# Patient Record
Sex: MALE | Race: OTHER | NOT HISPANIC OR LATINO
[De-identification: names, ages, dates, MRNs, and addresses within clinical notes are randomized per-mention and may not be internally consistent; named-entity substitution may affect disease eponyms.]

---

## 2017-08-07 ENCOUNTER — APPOINTMENT (OUTPATIENT)
Dept: INTERNAL MEDICINE | Facility: CLINIC | Age: 47
End: 2017-08-07

## 2017-09-20 ENCOUNTER — RX RENEWAL (OUTPATIENT)
Age: 47
End: 2017-09-20

## 2017-10-27 ENCOUNTER — APPOINTMENT (OUTPATIENT)
Dept: INTERNAL MEDICINE | Facility: CLINIC | Age: 47
End: 2017-10-27
Payer: COMMERCIAL

## 2017-10-27 ENCOUNTER — MED ADMIN CHARGE (OUTPATIENT)
Age: 47
End: 2017-10-27

## 2017-10-27 VITALS
OXYGEN SATURATION: 98 % | SYSTOLIC BLOOD PRESSURE: 110 MMHG | TEMPERATURE: 98.3 F | HEART RATE: 68 BPM | WEIGHT: 180 LBS | BODY MASS INDEX: 27.28 KG/M2 | HEIGHT: 68 IN | DIASTOLIC BLOOD PRESSURE: 76 MMHG

## 2017-10-27 DIAGNOSIS — L91.8 OTHER HYPERTROPHIC DISORDERS OF THE SKIN: ICD-10-CM

## 2017-10-27 DIAGNOSIS — R30.0 DYSURIA: ICD-10-CM

## 2017-10-27 DIAGNOSIS — R05 COUGH: ICD-10-CM

## 2017-10-27 DIAGNOSIS — M25.561 PAIN IN RIGHT KNEE: ICD-10-CM

## 2017-10-27 DIAGNOSIS — R03.0 ELEVATED BLOOD-PRESSURE READING, W/OUT DIAGNOSIS OF HYPERTENSION: ICD-10-CM

## 2017-10-27 DIAGNOSIS — Z88.9 ALLERGY STATUS TO UNSPECIFIED DRUGS, MEDICAMENTS AND BIOLOGICAL SUBSTANCES: ICD-10-CM

## 2017-10-27 DIAGNOSIS — B00.9 HERPESVIRAL INFECTION, UNSPECIFIED: ICD-10-CM

## 2017-10-27 DIAGNOSIS — Z92.29 PERSONAL HISTORY OF OTHER DRUG THERAPY: ICD-10-CM

## 2017-10-27 DIAGNOSIS — M25.562 PAIN IN RIGHT KNEE: ICD-10-CM

## 2017-10-27 DIAGNOSIS — Z87.09 PERSONAL HISTORY OF OTHER DISEASES OF THE RESPIRATORY SYSTEM: ICD-10-CM

## 2017-10-27 PROCEDURE — 99396 PREV VISIT EST AGE 40-64: CPT

## 2017-10-30 LAB
25(OH)D3 SERPL-MCNC: 25.4 NG/ML
ALBUMIN SERPL ELPH-MCNC: 4.8 G/DL
ALP BLD-CCNC: 75 U/L
ALT SERPL-CCNC: 52 U/L
ANION GAP SERPL CALC-SCNC: 14 MMOL/L
APPEARANCE: CLEAR
AST SERPL-CCNC: 25 U/L
BASOPHILS # BLD AUTO: 0.03 K/UL
BASOPHILS NFR BLD AUTO: 0.4 %
BILIRUB SERPL-MCNC: 0.4 MG/DL
BILIRUBIN URINE: NEGATIVE
BLOOD URINE: NEGATIVE
BUN SERPL-MCNC: 19 MG/DL
C TRACH RRNA SPEC QL NAA+PROBE: NOT DETECTED
CALCIUM SERPL-MCNC: 10 MG/DL
CHLORIDE SERPL-SCNC: 102 MMOL/L
CHOLEST SERPL-MCNC: 185 MG/DL
CHOLEST/HDLC SERPL: 5.1 RATIO
CO2 SERPL-SCNC: 25 MMOL/L
COLOR: YELLOW
CREAT SERPL-MCNC: 1.12 MG/DL
EOSINOPHIL # BLD AUTO: 0.17 K/UL
EOSINOPHIL NFR BLD AUTO: 2.2 %
GLUCOSE QUALITATIVE U: NEGATIVE MG/DL
GLUCOSE SERPL-MCNC: 103 MG/DL
HBA1C MFR BLD HPLC: 6 %
HCT VFR BLD CALC: 46.2 %
HDLC SERPL-MCNC: 36 MG/DL
HGB BLD-MCNC: 15.1 G/DL
HIV1+2 AB SPEC QL IA.RAPID: NONREACTIVE
IMM GRANULOCYTES NFR BLD AUTO: 0.1 %
KETONES URINE: NEGATIVE
LDLC SERPL CALC-MCNC: 110 MG/DL
LEUKOCYTE ESTERASE URINE: NEGATIVE
LYMPHOCYTES # BLD AUTO: 2.41 K/UL
LYMPHOCYTES NFR BLD AUTO: 30.7 %
MAN DIFF?: NORMAL
MCHC RBC-ENTMCNC: 27.1 PG
MCHC RBC-ENTMCNC: 32.7 GM/DL
MCV RBC AUTO: 82.8 FL
MONOCYTES # BLD AUTO: 0.41 K/UL
MONOCYTES NFR BLD AUTO: 5.2 %
N GONORRHOEA RRNA SPEC QL NAA+PROBE: NOT DETECTED
NEUTROPHILS # BLD AUTO: 4.82 K/UL
NEUTROPHILS NFR BLD AUTO: 61.4 %
NITRITE URINE: NEGATIVE
PH URINE: 5.5
PLATELET # BLD AUTO: 232 K/UL
POTASSIUM SERPL-SCNC: 4.9 MMOL/L
PROT SERPL-MCNC: 8.2 G/DL
PROTEIN URINE: NEGATIVE MG/DL
RBC # BLD: 5.58 M/UL
RBC # FLD: 13.5 %
SODIUM SERPL-SCNC: 141 MMOL/L
SOURCE AMPLIFICATION: NORMAL
SPECIFIC GRAVITY URINE: 1.02
TRIGL SERPL-MCNC: 194 MG/DL
TSH SERPL-ACNC: 4.09 UIU/ML
UROBILINOGEN URINE: NEGATIVE MG/DL
WBC # FLD AUTO: 7.85 K/UL

## 2018-04-22 ENCOUNTER — RX RENEWAL (OUTPATIENT)
Age: 48
End: 2018-04-22

## 2019-07-17 ENCOUNTER — TRANSCRIPTION ENCOUNTER (OUTPATIENT)
Age: 49
End: 2019-07-17

## 2020-11-17 ENCOUNTER — APPOINTMENT (OUTPATIENT)
Dept: INTERNAL MEDICINE | Facility: CLINIC | Age: 50
End: 2020-11-17
Payer: COMMERCIAL

## 2020-11-17 ENCOUNTER — LABORATORY RESULT (OUTPATIENT)
Age: 50
End: 2020-11-17

## 2020-11-17 ENCOUNTER — NON-APPOINTMENT (OUTPATIENT)
Age: 50
End: 2020-11-17

## 2020-11-17 VITALS
DIASTOLIC BLOOD PRESSURE: 80 MMHG | HEIGHT: 68 IN | WEIGHT: 190 LBS | HEART RATE: 83 BPM | OXYGEN SATURATION: 99 % | SYSTOLIC BLOOD PRESSURE: 110 MMHG | TEMPERATURE: 98.4 F | BODY MASS INDEX: 28.79 KG/M2

## 2020-11-17 DIAGNOSIS — Z01.84 ENCOUNTER FOR ANTIBODY RESPONSE EXAMINATION: ICD-10-CM

## 2020-11-17 DIAGNOSIS — Z23 ENCOUNTER FOR IMMUNIZATION: ICD-10-CM

## 2020-11-17 DIAGNOSIS — Z12.11 ENCOUNTER FOR SCREENING FOR MALIGNANT NEOPLASM OF COLON: ICD-10-CM

## 2020-11-17 DIAGNOSIS — Z87.891 PERSONAL HISTORY OF NICOTINE DEPENDENCE: ICD-10-CM

## 2020-11-17 PROCEDURE — 36415 COLL VENOUS BLD VENIPUNCTURE: CPT

## 2020-11-17 PROCEDURE — G0008: CPT

## 2020-11-17 PROCEDURE — 90686 IIV4 VACC NO PRSV 0.5 ML IM: CPT

## 2020-11-17 PROCEDURE — 93000 ELECTROCARDIOGRAM COMPLETE: CPT

## 2020-11-17 PROCEDURE — 99386 PREV VISIT NEW AGE 40-64: CPT | Mod: 25

## 2020-11-17 PROCEDURE — 99072 ADDL SUPL MATRL&STAF TM PHE: CPT

## 2020-11-17 RX ORDER — VALACYCLOVIR 500 MG/1
500 TABLET, FILM COATED ORAL
Qty: 30 | Refills: 2 | Status: DISCONTINUED | COMMUNITY
Start: 2017-10-27 | End: 2020-11-17

## 2020-11-17 NOTE — HISTORY OF PRESENT ILLNESS
[FreeTextEntry1] : New patient [de-identified] : 50 year old M comes to establish medical care and for an annual exam. Previously followed by Dr. Bob Ro in [facility]. Last physical was in 2017.\par \par He has history of hyperlipidemia, prior on Pravastatin but stopped in 2017. He reports having ED while on statin.\par He has borderline diabetes, last A1c 6.3. He tries to watch diet. He has not been exercising much due to pandemic but plans to restart again.\par He would like flu shot.\par He is due for initial colonoscopy screening. \par He has chronic shoulder pain L>R. No prior trauma.

## 2020-11-17 NOTE — PHYSICAL EXAM
[No Acute Distress] : no acute distress [Well Nourished] : well nourished [Well Developed] : well developed [Well-Appearing] : well-appearing [Normal Sclera/Conjunctiva] : normal sclera/conjunctiva [PERRL] : pupils equal round and reactive to light [EOMI] : extraocular movements intact [Normal Outer Ear/Nose] : the outer ears and nose were normal in appearance [Normal Oropharynx] : the oropharynx was normal [No JVD] : no jugular venous distention [No Lymphadenopathy] : no lymphadenopathy [Supple] : supple [Thyroid Normal, No Nodules] : the thyroid was normal and there were no nodules present [No Respiratory Distress] : no respiratory distress  [No Accessory Muscle Use] : no accessory muscle use [Clear to Auscultation] : lungs were clear to auscultation bilaterally [Normal Rate] : normal rate  [Regular Rhythm] : with a regular rhythm [Normal S1, S2] : normal S1 and S2 [No Murmur] : no murmur heard [No Carotid Bruits] : no carotid bruits [Pedal Pulses Present] : the pedal pulses are present [No Edema] : there was no peripheral edema [Soft] : abdomen soft [Non Tender] : non-tender [Non-distended] : non-distended [No Masses] : no abdominal mass palpated [No HSM] : no HSM [Normal Bowel Sounds] : normal bowel sounds [Normal Posterior Cervical Nodes] : no posterior cervical lymphadenopathy [Normal Anterior Cervical Nodes] : no anterior cervical lymphadenopathy [No CVA Tenderness] : no CVA  tenderness [No Spinal Tenderness] : no spinal tenderness [No Joint Swelling] : no joint swelling [Grossly Normal Strength/Tone] : grossly normal strength/tone [No Rash] : no rash [Coordination Grossly Intact] : coordination grossly intact [No Focal Deficits] : no focal deficits [Normal Gait] : normal gait [Normal Affect] : the affect was normal [Normal Mood] : the mood was normal [Normal Insight/Judgement] : insight and judgment were intact [de-identified] : friendly [FreeTextEntry1] : defer to GI  [de-identified] : shoulders FROM, non-tender  [de-identified] : hypopigmented macules on back

## 2020-11-17 NOTE — PLAN
[FreeTextEntry1] : Check routine fasting labs.\par Discussed diet, exercise, and weight maintenance. \par Flu shot given today.\par Due for colonoscopy screening. GI referral placed for Dr. Hall.\par Prostate cancer screening with PSA. \par Recommend dermatology exam. \par Check fasting lipids and A1c. Prior on Pravastatin. Watch diet and start exercising. Lose weight.\par Previously elevated ALT likely fatty liver. Check LFT's.\par Check vitamin D level, sometimes takes supplement.\par \par RTO 6 months for fasting labs.\par \par

## 2020-11-17 NOTE — HEALTH RISK ASSESSMENT
[Excellent] : ~his/her~  mood as  excellent [No] : In the past 12 months have you used drugs other than those required for medical reasons? No [No falls in past year] : Patient reported no falls in the past year [0] : 2) Feeling down, depressed, or hopeless: Not at all (0) [With Family] : lives with family [Employed] : employed [] :  [Sexually Active] : sexually active [Reports changes in vision] : Reports changes in vision [Patient declined colonoscopy] : Patient declined colonoscopy [None] : None [# Of Children ___] : has [unfilled] children [Smoke Detector] : smoke detector [Carbon Monoxide Detector] : carbon monoxide detector [Seat Belt] :  uses seat belt [Sunscreen] : uses sunscreen [] : No [Reports changes in hearing] : Reports no changes in hearing [Reports changes in dental health] : Reports no changes in dental health [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

## 2020-11-22 ENCOUNTER — TRANSCRIPTION ENCOUNTER (OUTPATIENT)
Age: 50
End: 2020-11-22

## 2020-11-22 LAB
25(OH)D3 SERPL-MCNC: 17.6 NG/ML
ALBUMIN SERPL ELPH-MCNC: 4.7 G/DL
ALP BLD-CCNC: 92 U/L
ALT SERPL-CCNC: 40 U/L
ANION GAP SERPL CALC-SCNC: 11 MMOL/L
APPEARANCE: CLEAR
AST SERPL-CCNC: 20 U/L
BACTERIA: NEGATIVE
BASOPHILS # BLD AUTO: 0.04 K/UL
BASOPHILS NFR BLD AUTO: 0.5 %
BILIRUB SERPL-MCNC: 0.4 MG/DL
BILIRUBIN URINE: NEGATIVE
BLOOD URINE: NEGATIVE
BUN SERPL-MCNC: 15 MG/DL
CALCIUM SERPL-MCNC: 9.6 MG/DL
CHLORIDE SERPL-SCNC: 100 MMOL/L
CHOLEST SERPL-MCNC: 218 MG/DL
CO2 SERPL-SCNC: 26 MMOL/L
COLOR: NORMAL
CREAT SERPL-MCNC: 0.94 MG/DL
EOSINOPHIL # BLD AUTO: 0.17 K/UL
EOSINOPHIL NFR BLD AUTO: 2 %
ESTIMATED AVERAGE GLUCOSE: 131 MG/DL
GLUCOSE QUALITATIVE U: NEGATIVE
GLUCOSE SERPL-MCNC: 101 MG/DL
HBA1C MFR BLD HPLC: 6.2 %
HCT VFR BLD CALC: 45.2 %
HDLC SERPL-MCNC: 29 MG/DL
HGB BLD-MCNC: 14 G/DL
HYALINE CASTS: 0 /LPF
IMM GRANULOCYTES NFR BLD AUTO: 0.2 %
KETONES URINE: NEGATIVE
LDLC SERPL CALC-MCNC: 153 MG/DL
LEUKOCYTE ESTERASE URINE: NEGATIVE
LYMPHOCYTES # BLD AUTO: 2.8 K/UL
LYMPHOCYTES NFR BLD AUTO: 32.6 %
MAN DIFF?: NORMAL
MCHC RBC-ENTMCNC: 25.9 PG
MCHC RBC-ENTMCNC: 31 GM/DL
MCV RBC AUTO: 83.7 FL
MICROSCOPIC-UA: NORMAL
MONOCYTES # BLD AUTO: 0.51 K/UL
MONOCYTES NFR BLD AUTO: 5.9 %
NEUTROPHILS # BLD AUTO: 5.04 K/UL
NEUTROPHILS NFR BLD AUTO: 58.8 %
NITRITE URINE: NEGATIVE
NONHDLC SERPL-MCNC: 189 MG/DL
PH URINE: 5.5
PLATELET # BLD AUTO: 246 K/UL
POTASSIUM SERPL-SCNC: 4.5 MMOL/L
PROT SERPL-MCNC: 7.7 G/DL
PROTEIN URINE: NEGATIVE
PSA SERPL-MCNC: 0.68 NG/ML
RBC # BLD: 5.4 M/UL
RBC # FLD: 13.2 %
RED BLOOD CELLS URINE: 1 /HPF
SARS-COV-2 IGG SERPL IA-ACNC: 0.1 INDEX
SARS-COV-2 IGG SERPL QL IA: NEGATIVE
SODIUM SERPL-SCNC: 138 MMOL/L
SPECIFIC GRAVITY URINE: 1.02
SQUAMOUS EPITHELIAL CELLS: 0 /HPF
TRIGL SERPL-MCNC: 181 MG/DL
TSH SERPL-ACNC: 4.56 UIU/ML
UROBILINOGEN URINE: NORMAL
VIT B12 SERPL-MCNC: 380 PG/ML
WBC # FLD AUTO: 8.58 K/UL
WHITE BLOOD CELLS URINE: 0 /HPF

## 2021-05-04 PROBLEM — Z00.00 ENCOUNTER FOR PREVENTIVE HEALTH EXAMINATION: Noted: 2021-05-04

## 2021-05-07 ENCOUNTER — APPOINTMENT (OUTPATIENT)
Dept: GASTROENTEROLOGY | Facility: CLINIC | Age: 51
End: 2021-05-07
Payer: COMMERCIAL

## 2021-05-07 VITALS
HEIGHT: 68 IN | OXYGEN SATURATION: 98 % | WEIGHT: 192 LBS | BODY MASS INDEX: 29.1 KG/M2 | DIASTOLIC BLOOD PRESSURE: 91 MMHG | HEART RATE: 84 BPM | SYSTOLIC BLOOD PRESSURE: 135 MMHG

## 2021-05-07 DIAGNOSIS — Z78.9 OTHER SPECIFIED HEALTH STATUS: ICD-10-CM

## 2021-05-07 DIAGNOSIS — Z83.71 FAMILY HISTORY OF COLONIC POLYPS: ICD-10-CM

## 2021-05-07 PROCEDURE — 99204 OFFICE O/P NEW MOD 45 MIN: CPT

## 2021-05-07 PROCEDURE — 99072 ADDL SUPL MATRL&STAF TM PHE: CPT

## 2021-05-07 NOTE — PHYSICAL EXAM
[General Appearance - Alert] : alert [General Appearance - In No Acute Distress] : in no acute distress [Sclera] : the sclera and conjunctiva were normal [Extraocular Movements] : extraocular movements were intact [Hearing Threshold Finger Rub Not [place]] : hearing was normal [Outer Ear] : the ears and nose were normal in appearance [Neck Appearance] : the appearance of the neck was normal [Neck Cervical Mass (___cm)] : no neck mass was observed [Auscultation Breath Sounds / Voice Sounds] : lungs were clear to auscultation bilaterally [Heart Rate And Rhythm] : heart rate was normal and rhythm regular [Heart Sounds] : normal S1 and S2 [Heart Sounds Gallop] : no gallops [Murmurs] : no murmurs [Heart Sounds Pericardial Friction Rub] : no pericardial rub [Bowel Sounds] : normal bowel sounds [Abdomen Soft] : soft [Abdomen Tenderness] : non-tender [Abdomen Mass (___ Cm)] : no abdominal mass palpated [Cervical Lymph Nodes Enlarged Posterior Bilaterally] : posterior cervical [Cervical Lymph Nodes Enlarged Anterior Bilaterally] : anterior cervical [Supraclavicular Lymph Nodes Enlarged Bilaterally] : supraclavicular [Abnormal Walk] : normal gait [Nail Clubbing] : no clubbing  or cyanosis of the fingernails [Skin Color & Pigmentation] : normal skin color and pigmentation [] : no rash [Oriented To Time, Place, And Person] : oriented to person, place, and time [Impaired Insight] : insight and judgment were intact [Affect] : the affect was normal

## 2021-05-08 NOTE — ASSESSMENT
Problem: Patient Care Overview  Goal: Plan of Care Review  Outcome: Ongoing (interventions implemented as appropriate)  Pt tolerated INV [id number] & velcade without adverse effects. Followed study protocol. VSS. Provided AVS & verbalized understanding of RTC date. DC after 90 min obs period ambulating independently.          [FreeTextEntry1] : IMPRESSION: \par #Due for a screening test for colon cancer \par -  Never had a colonoscopy\par #Family history of colon polyps \par -  Mom with colon polyps \par \par \par \par \par PLAN: \par I had a long discussion with the patient regarding colon cancer in the [place].    \par \par We reviewed risk factors for colon cancer which include age, having a family hx of colon cancer, cancer syndromes, personal hx of inflammatory disease, having comorbidities such as obesity, DM, cardiac disease, having nicotine addiction, alcohol addiction,etc.  \par \par Patient is average risk for colon cancer.  \par \par We reviewed the screening tests for colon cancer prevention.  We discussed screening tests such as stool test, CT scans such as CT colonography, and a colonoscopy.    Patient was made aware that despite these screening test, a cancerous lesion can still be missed.  The gold standard test is a screening colonoscopy.\par \par I have advised the patient to arrange for a colonoscopy to rule out colon polyps, colorectal cancer etc. under monitored anesthesia care.  Risks such as perforation  (4 in 10,000) requiring surgery, bleeding (8 in 10,000), infection, diverticulitis, colitis, missed colon cancer (2% to 6%), internal organ injury, etc, risks of bowel prep including colitis, syncope, adverse reaction to medication etc. and risks of anesthesia including cardiopulmonary compromise were discussed with patient.  Patient verbalized understanding and agrees to proceed with the planned procedure.\par \par

## 2021-05-08 NOTE — HISTORY OF PRESENT ILLNESS
[de-identified] : 50 y/o father of four, who presents for a colonoscopy. \par \par Has never had a colonoscopy before. \par \par No constipation\par No diarrhea. \par No melena and no hematochezia.\par No abdominal pain, and no rectal pain.\par \par No loss of appetite, no weight loss. \par No heartburn. \par No difficulty swallowing, no painful swallowing. \par \par No nausea and no vomiting\par \par Patient denies family history of GI cancers.\par \par Joint pains.  All other review of systems are negative.  Denies cardiac symptoms.

## 2021-05-20 ENCOUNTER — APPOINTMENT (OUTPATIENT)
Dept: INTERNAL MEDICINE | Facility: CLINIC | Age: 51
End: 2021-05-20

## 2021-07-29 ENCOUNTER — TRANSCRIPTION ENCOUNTER (OUTPATIENT)
Age: 51
End: 2021-07-29

## 2022-01-24 ENCOUNTER — NON-APPOINTMENT (OUTPATIENT)
Age: 52
End: 2022-01-24

## 2022-01-24 ENCOUNTER — APPOINTMENT (OUTPATIENT)
Dept: INTERNAL MEDICINE | Facility: CLINIC | Age: 52
End: 2022-01-24
Payer: COMMERCIAL

## 2022-01-24 VITALS
HEIGHT: 68 IN | OXYGEN SATURATION: 98 % | DIASTOLIC BLOOD PRESSURE: 80 MMHG | TEMPERATURE: 98.6 F | BODY MASS INDEX: 28.95 KG/M2 | HEART RATE: 78 BPM | WEIGHT: 191 LBS | SYSTOLIC BLOOD PRESSURE: 100 MMHG

## 2022-01-24 DIAGNOSIS — Z20.822 CONTACT WITH AND (SUSPECTED) EXPOSURE TO COVID-19: ICD-10-CM

## 2022-01-24 PROCEDURE — 36415 COLL VENOUS BLD VENIPUNCTURE: CPT

## 2022-01-24 PROCEDURE — 93000 ELECTROCARDIOGRAM COMPLETE: CPT

## 2022-01-24 PROCEDURE — 99396 PREV VISIT EST AGE 40-64: CPT | Mod: 25

## 2022-01-24 PROCEDURE — G0008: CPT

## 2022-01-24 PROCEDURE — 90686 IIV4 VACC NO PRSV 0.5 ML IM: CPT

## 2022-01-24 NOTE — REVIEW OF SYSTEMS
[Negative] : Heme/Lymph [Recent Change In Weight] : ~T recent weight change [FreeTextEntry2] : gained few lbs

## 2022-01-24 NOTE — PHYSICAL EXAM
[Well Nourished] : well nourished [Well Developed] : well developed [Well-Appearing] : well-appearing [Normal Sclera/Conjunctiva] : normal sclera/conjunctiva [PERRL] : pupils equal round and reactive to light [EOMI] : extraocular movements intact [Normal Outer Ear/Nose] : the outer ears and nose were normal in appearance [Normal Oropharynx] : the oropharynx was normal [No JVD] : no jugular venous distention [No Lymphadenopathy] : no lymphadenopathy [Supple] : supple [Thyroid Normal, No Nodules] : the thyroid was normal and there were no nodules present [No Respiratory Distress] : no respiratory distress  [No Accessory Muscle Use] : no accessory muscle use [Clear to Auscultation] : lungs were clear to auscultation bilaterally [Normal Rate] : normal rate  [Regular Rhythm] : with a regular rhythm [Normal S1, S2] : normal S1 and S2 [No Murmur] : no murmur heard [No Carotid Bruits] : no carotid bruits [Pedal Pulses Present] : the pedal pulses are present [No Edema] : there was no peripheral edema [Soft] : abdomen soft [Non Tender] : non-tender [Non-distended] : non-distended [No Masses] : no abdominal mass palpated [No HSM] : no HSM [Normal Bowel Sounds] : normal bowel sounds [Normal Posterior Cervical Nodes] : no posterior cervical lymphadenopathy [Normal Anterior Cervical Nodes] : no anterior cervical lymphadenopathy [No CVA Tenderness] : no CVA  tenderness [No Spinal Tenderness] : no spinal tenderness [No Joint Swelling] : no joint swelling [Grossly Normal Strength/Tone] : grossly normal strength/tone [No Rash] : no rash [Coordination Grossly Intact] : coordination grossly intact [No Focal Deficits] : no focal deficits [Normal Gait] : normal gait [Normal Affect] : the affect was normal [Normal Mood] : the mood was normal [Normal Insight/Judgement] : insight and judgment were intact [de-identified] : friendly [FreeTextEntry1] : defer to GI  [de-identified] : hypopigmented macules on back

## 2022-01-24 NOTE — HEALTH RISK ASSESSMENT
[Excellent] : ~his/her~  mood as  excellent [No] : In the past 12 months have you used drugs other than those required for medical reasons? No [No falls in past year] : Patient reported no falls in the past year [0] : 2) Feeling down, depressed, or hopeless: Not at all (0) [Patient declined colonoscopy] : Patient declined colonoscopy [None] : None [With Family] : lives with family [Employed] : employed [] :  [# Of Children ___] : has [unfilled] children [Sexually Active] : sexually active [Reports changes in vision] : Reports changes in vision [Smoke Detector] : smoke detector [Carbon Monoxide Detector] : carbon monoxide detector [Seat Belt] :  uses seat belt [Sunscreen] : uses sunscreen [Never] : Never [Reports changes in hearing] : Reports no changes in hearing [Reports changes in dental health] : Reports no changes in dental health [ColonoscopyComments] : never  [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

## 2022-01-24 NOTE — PLAN
[FreeTextEntry1] : Check routine fasting labs.\par Discussed diet, exercise, and weight maintenance. \par Flu shot given today. Received COVID vaccine x 3.\par Advised due for colonoscopy screening. Recommend seeing Dr. Hall. \par Prostate cancer screening with PSA. \par Recommend dermatology exam. \par Check fasting lipids and A1c. Watch diet and start exercising. Lose weight.\par Check vitamin D level, not on supplement.\par Check COVID PCR prior to travel [place].\par \par RTO in 1 yr for annual exam or earlier PRN for acute care.\par \par

## 2022-01-24 NOTE — HISTORY OF PRESENT ILLNESS
[FreeTextEntry1] : physical [de-identified] : Patient comes for an annual exam.\par \par He gained a few pounds. Not watching diet well. Not exercising much. He plans to improve his lifestyle. \par He is travelling to [place] on 1/27, needs COVID PCR.\par

## 2022-01-25 ENCOUNTER — TRANSCRIPTION ENCOUNTER (OUTPATIENT)
Age: 52
End: 2022-01-25

## 2022-01-25 LAB
25(OH)D3 SERPL-MCNC: 20.7 NG/ML
ALBUMIN SERPL ELPH-MCNC: 5 G/DL
ALP BLD-CCNC: 87 U/L
ALT SERPL-CCNC: 45 U/L
ANION GAP SERPL CALC-SCNC: 18 MMOL/L
APPEARANCE: CLEAR
AST SERPL-CCNC: 29 U/L
BACTERIA: NEGATIVE
BASOPHILS # BLD AUTO: 0.04 K/UL
BASOPHILS NFR BLD AUTO: 0.4 %
BILIRUB SERPL-MCNC: 0.6 MG/DL
BILIRUBIN URINE: NEGATIVE
BLOOD URINE: NEGATIVE
BUN SERPL-MCNC: 16 MG/DL
CALCIUM SERPL-MCNC: 10.2 MG/DL
CHLORIDE SERPL-SCNC: 100 MMOL/L
CHOLEST SERPL-MCNC: 276 MG/DL
CO2 SERPL-SCNC: 22 MMOL/L
COLOR: YELLOW
CREAT SERPL-MCNC: 0.94 MG/DL
EOSINOPHIL # BLD AUTO: 0.16 K/UL
EOSINOPHIL NFR BLD AUTO: 1.8 %
ESTIMATED AVERAGE GLUCOSE: 131 MG/DL
GLUCOSE QUALITATIVE U: NEGATIVE
GLUCOSE SERPL-MCNC: 94 MG/DL
HBA1C MFR BLD HPLC: 6.2 %
HCT VFR BLD CALC: 45.8 %
HDLC SERPL-MCNC: 37 MG/DL
HGB BLD-MCNC: 14.3 G/DL
HYALINE CASTS: 0 /LPF
IMM GRANULOCYTES NFR BLD AUTO: 0.2 %
KETONES URINE: NEGATIVE
LDLC SERPL CALC-MCNC: 205 MG/DL
LEUKOCYTE ESTERASE URINE: NEGATIVE
LYMPHOCYTES # BLD AUTO: 3.06 K/UL
LYMPHOCYTES NFR BLD AUTO: 34 %
MAN DIFF?: NORMAL
MCHC RBC-ENTMCNC: 26 PG
MCHC RBC-ENTMCNC: 31.2 GM/DL
MCV RBC AUTO: 83.1 FL
MICROSCOPIC-UA: NORMAL
MONOCYTES # BLD AUTO: 0.53 K/UL
MONOCYTES NFR BLD AUTO: 5.9 %
NEUTROPHILS # BLD AUTO: 5.18 K/UL
NEUTROPHILS NFR BLD AUTO: 57.7 %
NITRITE URINE: NEGATIVE
NONHDLC SERPL-MCNC: 239 MG/DL
PH URINE: 6
PLATELET # BLD AUTO: 264 K/UL
POTASSIUM SERPL-SCNC: 4.5 MMOL/L
PROT SERPL-MCNC: 8 G/DL
PROTEIN URINE: NEGATIVE
PSA SERPL-MCNC: 0.81 NG/ML
RBC # BLD: 5.51 M/UL
RBC # FLD: 13.7 %
RED BLOOD CELLS URINE: 1 /HPF
SARS-COV-2 N GENE NPH QL NAA+PROBE: NOT DETECTED
SODIUM SERPL-SCNC: 141 MMOL/L
SPECIFIC GRAVITY URINE: 1.02
SQUAMOUS EPITHELIAL CELLS: 0 /HPF
TRIGL SERPL-MCNC: 171 MG/DL
TSH SERPL-ACNC: 3.59 UIU/ML
UROBILINOGEN URINE: NORMAL
VIT B12 SERPL-MCNC: 891 PG/ML
WBC # FLD AUTO: 8.99 K/UL
WHITE BLOOD CELLS URINE: 0 /HPF

## 2022-01-26 ENCOUNTER — TRANSCRIPTION ENCOUNTER (OUTPATIENT)
Age: 52
End: 2022-01-26

## 2022-05-16 ENCOUNTER — NON-APPOINTMENT (OUTPATIENT)
Age: 52
End: 2022-05-16

## 2022-11-16 ENCOUNTER — TRANSCRIPTION ENCOUNTER (OUTPATIENT)
Age: 52
End: 2022-11-16

## 2022-12-01 ENCOUNTER — APPOINTMENT (OUTPATIENT)
Dept: INTERNAL MEDICINE | Facility: CLINIC | Age: 52
End: 2022-12-01

## 2022-12-01 VITALS
TEMPERATURE: 98.5 F | BODY MASS INDEX: 28.79 KG/M2 | SYSTOLIC BLOOD PRESSURE: 116 MMHG | OXYGEN SATURATION: 98 % | HEART RATE: 90 BPM | WEIGHT: 190 LBS | DIASTOLIC BLOOD PRESSURE: 82 MMHG | HEIGHT: 68 IN

## 2022-12-01 DIAGNOSIS — R68.82 DECREASED LIBIDO: ICD-10-CM

## 2022-12-01 PROCEDURE — 99214 OFFICE O/P EST MOD 30 MIN: CPT | Mod: 25

## 2022-12-01 PROCEDURE — G0008: CPT

## 2022-12-01 PROCEDURE — 90686 IIV4 VACC NO PRSV 0.5 ML IM: CPT

## 2022-12-01 PROCEDURE — 36415 COLL VENOUS BLD VENIPUNCTURE: CPT

## 2022-12-01 NOTE — HISTORY OF PRESENT ILLNESS
[FreeTextEntry1] : HLD, borderline DM [de-identified] : Pt comes for follow-up visit and fasting labs. Last seen in 1/22. \par \par Labs in 1/22 revealed worsened LDL to 205, A1c 6.2. \par He is now on an intermittent fast diet. Previously on Pravastatin few yrs ago.\par He joined [employer], does weights and cardio.

## 2022-12-01 NOTE — PLAN
[FreeTextEntry1] : Check fasting lipids. May need to restart statin pending LDL result. \par Borderline DM - check A1c. Limit sweets.\par Continue diet / exercise. Lose weight.\par Flu shot given today. \par \par RTO in 3 months for physical.\par \par

## 2022-12-01 NOTE — PHYSICAL EXAM
[Well Nourished] : well nourished [Well Developed] : well developed [Well-Appearing] : well-appearing [No Lymphadenopathy] : no lymphadenopathy [Supple] : supple [Thyroid Normal, No Nodules] : the thyroid was normal and there were no nodules present [No Respiratory Distress] : no respiratory distress  [No Accessory Muscle Use] : no accessory muscle use [Clear to Auscultation] : lungs were clear to auscultation bilaterally [Normal Rate] : normal rate  [Regular Rhythm] : with a regular rhythm [Normal S1, S2] : normal S1 and S2 [No Murmur] : no murmur heard [No Edema] : there was no peripheral edema [Soft] : abdomen soft [Non Tender] : non-tender [Non-distended] : non-distended [No HSM] : no HSM [Normal Bowel Sounds] : normal bowel sounds [Normal Posterior Cervical Nodes] : no posterior cervical lymphadenopathy [Normal Anterior Cervical Nodes] : no anterior cervical lymphadenopathy [No Rash] : no rash [Normal Gait] : normal gait [Normal Affect] : the affect was normal [Normal Mood] : the mood was normal [de-identified] : friendly [de-identified] : hypopigmented macules on back

## 2022-12-02 ENCOUNTER — TRANSCRIPTION ENCOUNTER (OUTPATIENT)
Age: 52
End: 2022-12-02

## 2022-12-02 LAB
ALBUMIN SERPL ELPH-MCNC: 4.7 G/DL
ALP BLD-CCNC: 77 U/L
ALT SERPL-CCNC: 35 U/L
ANION GAP SERPL CALC-SCNC: 13 MMOL/L
AST SERPL-CCNC: 23 U/L
BILIRUB SERPL-MCNC: 0.4 MG/DL
BUN SERPL-MCNC: 21 MG/DL
CALCIUM SERPL-MCNC: 9.4 MG/DL
CHLORIDE SERPL-SCNC: 104 MMOL/L
CHOLEST SERPL-MCNC: 263 MG/DL
CO2 SERPL-SCNC: 25 MMOL/L
CREAT SERPL-MCNC: 1 MG/DL
EGFR: 89 ML/MIN/1.73M2
ESTIMATED AVERAGE GLUCOSE: 134 MG/DL
GLUCOSE SERPL-MCNC: 101 MG/DL
HBA1C MFR BLD HPLC: 6.3 %
HDLC SERPL-MCNC: 35 MG/DL
LDLC SERPL CALC-MCNC: 201 MG/DL
NONHDLC SERPL-MCNC: 227 MG/DL
POTASSIUM SERPL-SCNC: 4.3 MMOL/L
PROT SERPL-MCNC: 7.8 G/DL
SODIUM SERPL-SCNC: 142 MMOL/L
TRIGL SERPL-MCNC: 134 MG/DL

## 2022-12-06 LAB
TESTOST FREE SERPL-MCNC: 7.3 PG/ML
TESTOST SERPL-MCNC: 297 NG/DL

## 2023-06-30 ENCOUNTER — RX RENEWAL (OUTPATIENT)
Age: 53
End: 2023-06-30

## 2023-08-22 ENCOUNTER — NON-APPOINTMENT (OUTPATIENT)
Age: 53
End: 2023-08-22

## 2023-08-22 ENCOUNTER — LABORATORY RESULT (OUTPATIENT)
Age: 53
End: 2023-08-22

## 2023-08-22 ENCOUNTER — APPOINTMENT (OUTPATIENT)
Dept: INTERNAL MEDICINE | Facility: CLINIC | Age: 53
End: 2023-08-22
Payer: COMMERCIAL

## 2023-08-22 VITALS
SYSTOLIC BLOOD PRESSURE: 121 MMHG | WEIGHT: 189 LBS | OXYGEN SATURATION: 97 % | TEMPERATURE: 98 F | HEART RATE: 74 BPM | BODY MASS INDEX: 28.64 KG/M2 | HEIGHT: 68 IN | DIASTOLIC BLOOD PRESSURE: 82 MMHG

## 2023-08-22 DIAGNOSIS — E55.9 VITAMIN D DEFICIENCY, UNSPECIFIED: ICD-10-CM

## 2023-08-22 DIAGNOSIS — Z00.00 ENCOUNTER FOR GENERAL ADULT MEDICAL EXAMINATION W/OUT ABNORMAL FINDINGS: ICD-10-CM

## 2023-08-22 DIAGNOSIS — Z12.11 ENCOUNTER FOR SCREENING FOR MALIGNANT NEOPLASM OF COLON: ICD-10-CM

## 2023-08-22 PROCEDURE — 93000 ELECTROCARDIOGRAM COMPLETE: CPT

## 2023-08-22 PROCEDURE — 99396 PREV VISIT EST AGE 40-64: CPT | Mod: 25

## 2023-08-22 PROCEDURE — 36415 COLL VENOUS BLD VENIPUNCTURE: CPT

## 2023-08-22 NOTE — HEALTH RISK ASSESSMENT
[Excellent] : ~his/her~  mood as  excellent [No] : In the past 12 months have you used drugs other than those required for medical reasons? No [No falls in past year] : Patient reported no falls in the past year [0] : 2) Feeling down, depressed, or hopeless: Not at all (0) [Patient declined colonoscopy] : Patient declined colonoscopy [None] : None [With Family] : lives with family [Employed] : employed [] :  [# Of Children ___] : has [unfilled] children [Sexually Active] : sexually active [Reports changes in vision] : Reports changes in vision [Smoke Detector] : smoke detector [Carbon Monoxide Detector] : carbon monoxide detector [Seat Belt] :  uses seat belt [Sunscreen] : uses sunscreen [Former] : Former [10-14] : 10-14 [< 15 Years] : < 15 Years [de-identified] : gym - weights / cardio 3x a week  [Reports changes in hearing] : Reports no changes in hearing [Reports changes in dental health] : Reports no changes in dental health [ColonoscopyComments] : never  [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

## 2023-08-22 NOTE — PLAN
[FreeTextEntry1] : Check routine fasting labs. Discussed diet, exercise, and weight maintenance.  Received COVID vaccine x 3. Flu shot in Fall.  Advised again to see GI for screening colonoscopy.  Prostate cancer screening with PSA.  HLD - check fasting lipid panel / chemistries, continue Pravastatin.  Scheduled to see ortho next month for right shoulder pain. Check vitamin D level, not on supplement.  RTO 4 months fasting.

## 2023-08-22 NOTE — PHYSICAL EXAM
[Well Nourished] : well nourished [Well Developed] : well developed [Well-Appearing] : well-appearing [Normal Sclera/Conjunctiva] : normal sclera/conjunctiva [PERRL] : pupils equal round and reactive to light [EOMI] : extraocular movements intact [Normal Outer Ear/Nose] : the outer ears and nose were normal in appearance [Normal Oropharynx] : the oropharynx was normal [No JVD] : no jugular venous distention [No Lymphadenopathy] : no lymphadenopathy [Supple] : supple [Thyroid Normal, No Nodules] : the thyroid was normal and there were no nodules present [No Respiratory Distress] : no respiratory distress  [No Accessory Muscle Use] : no accessory muscle use [Clear to Auscultation] : lungs were clear to auscultation bilaterally [Normal Rate] : normal rate  [Regular Rhythm] : with a regular rhythm [Normal S1, S2] : normal S1 and S2 [No Murmur] : no murmur heard [No Carotid Bruits] : no carotid bruits [Pedal Pulses Present] : the pedal pulses are present [No Edema] : there was no peripheral edema [Soft] : abdomen soft [Non Tender] : non-tender [Non-distended] : non-distended [No Masses] : no abdominal mass palpated [No HSM] : no HSM [Normal Bowel Sounds] : normal bowel sounds [No CVA Tenderness] : no CVA  tenderness [No Spinal Tenderness] : no spinal tenderness [No Joint Swelling] : no joint swelling [Grossly Normal Strength/Tone] : grossly normal strength/tone [No Rash] : no rash [Coordination Grossly Intact] : coordination grossly intact [No Focal Deficits] : no focal deficits [Normal Gait] : normal gait [Normal Affect] : the affect was normal [Normal Mood] : the mood was normal [Normal Insight/Judgement] : insight and judgment were intact [de-identified] : friendly [FreeTextEntry1] : defer to GI  [de-identified] : hypopigmented macules on back

## 2023-08-22 NOTE — HISTORY OF PRESENT ILLNESS
[FreeTextEntry1] : physical [de-identified] : Patient comes for an annual exam.  He reports feeling well. He is mostly compliant with Pravastatin, usually takes 5x a week. He complains of right posterior shoulder pain x few months. Pain not improving. Sleeps on right side. Seeing ortho Dr. Santamaria for consult on 9/5.

## 2023-08-22 NOTE — REVIEW OF SYSTEMS
[Recent Change In Weight] : ~T recent weight change [Negative] : Heme/Lymph [Joint Pain] : joint pain [FreeTextEntry2] : 1 lb loss  [FreeTextEntry9] : right shoulder pain x few months

## 2023-08-23 ENCOUNTER — TRANSCRIPTION ENCOUNTER (OUTPATIENT)
Age: 53
End: 2023-08-23

## 2023-08-23 LAB
25(OH)D3 SERPL-MCNC: 21 NG/ML
ALBUMIN SERPL ELPH-MCNC: 4.7 G/DL
ALP BLD-CCNC: 80 U/L
ALT SERPL-CCNC: 27 U/L
ANION GAP SERPL CALC-SCNC: 13 MMOL/L
APPEARANCE: CLEAR
AST SERPL-CCNC: 19 U/L
BACTERIA: NEGATIVE /HPF
BASOPHILS # BLD AUTO: 0.04 K/UL
BASOPHILS NFR BLD AUTO: 0.5 %
BILIRUB SERPL-MCNC: 0.5 MG/DL
BILIRUBIN URINE: NEGATIVE
BLOOD URINE: NEGATIVE
BUN SERPL-MCNC: 19 MG/DL
CALCIUM SERPL-MCNC: 9.5 MG/DL
CAST: 0 /LPF
CHLORIDE SERPL-SCNC: 103 MMOL/L
CHOLEST SERPL-MCNC: 208 MG/DL
CO2 SERPL-SCNC: 24 MMOL/L
COLOR: YELLOW
CREAT SERPL-MCNC: 0.91 MG/DL
EGFR: 100 ML/MIN/1.73M2
EOSINOPHIL # BLD AUTO: 0.14 K/UL
EOSINOPHIL NFR BLD AUTO: 1.8 %
EPITHELIAL CELLS: 0 /HPF
ESTIMATED AVERAGE GLUCOSE: 134 MG/DL
GLUCOSE QUALITATIVE U: NEGATIVE MG/DL
GLUCOSE SERPL-MCNC: 101 MG/DL
HBA1C MFR BLD HPLC: 6.3 %
HCT VFR BLD CALC: 44.2 %
HDLC SERPL-MCNC: 37 MG/DL
HGB BLD-MCNC: 13.9 G/DL
IMM GRANULOCYTES NFR BLD AUTO: 0.1 %
KETONES URINE: NEGATIVE MG/DL
LDLC SERPL CALC-MCNC: 140 MG/DL
LEUKOCYTE ESTERASE URINE: ABNORMAL
LYMPHOCYTES # BLD AUTO: 2.76 K/UL
LYMPHOCYTES NFR BLD AUTO: 34.5 %
MAN DIFF?: NORMAL
MCHC RBC-ENTMCNC: 26.5 PG
MCHC RBC-ENTMCNC: 31.4 GM/DL
MCV RBC AUTO: 84.2 FL
MICROSCOPIC-UA: NORMAL
MONOCYTES # BLD AUTO: 0.46 K/UL
MONOCYTES NFR BLD AUTO: 5.8 %
NEUTROPHILS # BLD AUTO: 4.58 K/UL
NEUTROPHILS NFR BLD AUTO: 57.3 %
NITRITE URINE: NEGATIVE
NONHDLC SERPL-MCNC: 171 MG/DL
PH URINE: 6
PLATELET # BLD AUTO: 236 K/UL
POTASSIUM SERPL-SCNC: 4.5 MMOL/L
PROT SERPL-MCNC: 7.5 G/DL
PROTEIN URINE: NEGATIVE MG/DL
PSA SERPL-MCNC: 0.87 NG/ML
RBC # BLD: 5.25 M/UL
RBC # FLD: 14.2 %
RED BLOOD CELLS URINE: 0 /HPF
SODIUM SERPL-SCNC: 140 MMOL/L
SPECIFIC GRAVITY URINE: 1.02
TRIGL SERPL-MCNC: 170 MG/DL
TSH SERPL-ACNC: 4.74 UIU/ML
UROBILINOGEN URINE: 0.2 MG/DL
VIT B12 SERPL-MCNC: 553 PG/ML
WBC # FLD AUTO: 7.99 K/UL
WHITE BLOOD CELLS URINE: 2 /HPF

## 2023-09-05 ENCOUNTER — APPOINTMENT (OUTPATIENT)
Dept: ORTHOPEDIC SURGERY | Facility: CLINIC | Age: 53
End: 2023-09-05
Payer: COMMERCIAL

## 2023-09-05 VITALS — WEIGHT: 189 LBS | HEIGHT: 68 IN | BODY MASS INDEX: 28.64 KG/M2

## 2023-09-05 PROCEDURE — 73030 X-RAY EXAM OF SHOULDER: CPT | Mod: RT

## 2023-09-05 PROCEDURE — 99203 OFFICE O/P NEW LOW 30 MIN: CPT

## 2023-09-07 NOTE — PHYSICAL EXAM
[de-identified] : Right shoulder exam:   Inspection: No malalignment, No defects, No atrophy Skin: No masses, No lesions Neck: Negative Spurling, full ROM, no pain with ROM AROM: FF to 180, abduction to 90, ER to 60, IR to upper lumbar Painful arc ROM: none Tenderness: No bicipital tenderness, no tenderness to greater tuberosity/RTC insertion, no anterior shoulder/lesser tuberosity tenderness Strength: 4+/5 ER, 5/5 IR in adduction, 5/5 supraspinatus testing, negative Am[place]st's test AC joint: No TTP/pain with cross arm testing Biceps: Speed Negative, Yergason Negative Impingement test: +Salcido, Negative Neer Vasc: 2+ radial pulse Stability: Stable Neuro: AIN, PIN, Ulnar nerve intact to motor Sensation: Intact to light touch throughout Other findings: None.  [de-identified] : The following radiographs were ordered and read by me during this patients visit. I reviewed each radiograph in detail with the patient and discussed the findings as highlighted below.  3 views of right shoulder were obtained today, 09/05/2023, that show no acute fracture or dislocation. There is no glenohumeral and no AC joint degenerative change seen. Type II acromion. There is no significant malalignment. No significant other obvious osseous abnormality, otherwise unremarkable.

## 2023-09-07 NOTE — ADDENDUM
[FreeTextEntry1] : This note was written by Brigida Mccallum on 09/05/2023 acting solely as a scribe for Dr. Diogo Santamaria.  All medical record entries made by the Scribe were at my, Dr. Diogo Santamaria, direction and personally dictated by me on 09/05/2023. I have personally reviewed the chart and agree that the record accurately reflects my personal performance of the history, physical exam, assessment and plan.

## 2023-09-07 NOTE — HISTORY OF PRESENT ILLNESS
[de-identified] : 53 year old RHD male presents today with right shoulder pain x 1 year. No injury reported. The pain is constant worse with lifting, shoulder exercises and FF. C/o numbness when laying on the arm. Localizes pain to the posterior shoulder. He is not taking pain medication. Denies prior shoulder sx.   The patient's past medical history, past surgical history, medications and allergies were reviewed by me today with the patient and documented accordingly. In addition, the patient's family and social history, which were noncontributory to this visit, were reviewed also.

## 2023-09-07 NOTE — DISCUSSION/SUMMARY
[de-identified] : 54 y/o male with right periscapular pain.   Patient has myofascial discomfort through the right upper extremity throughout the periscapular/cervical/shoulder region. There is minimal radiation of symptoms to the upper extremity consistent with a neurologic or component. There is also no significant pain with range of motion of right shoulder consistent with symptoms of concurrent bursitis. I discussed with the patient that the majority of symptoms are not directly related to shoulder internal derangement/pathology and may or may not be directly related to cervical dysfunction.   Treatment for this condition is through conservative/nonoperative means; including anti-inflammatory medication, physical therapy, massage therapy, acupuncture, and chiropractic care.  Recommendation: Begin trial of PT, Rx given. Conservative care & observation, this includes rest/activity avoidance until less symptomatic with subsequent gradual return to full activity as tolerated. Patient may also use OTC NSAID's or acetaminophen as tolerated, with application of ice to the area 2-3x daily for 20 minutes after periods of activity.   Follow-up as needed.

## 2023-12-21 ENCOUNTER — APPOINTMENT (OUTPATIENT)
Dept: INTERNAL MEDICINE | Facility: CLINIC | Age: 53
End: 2023-12-21
Payer: COMMERCIAL

## 2023-12-21 VITALS
SYSTOLIC BLOOD PRESSURE: 150 MMHG | WEIGHT: 192 LBS | HEART RATE: 80 BPM | HEIGHT: 68 IN | OXYGEN SATURATION: 97 % | DIASTOLIC BLOOD PRESSURE: 84 MMHG | BODY MASS INDEX: 29.1 KG/M2 | RESPIRATION RATE: 16 BRPM | TEMPERATURE: 98.1 F

## 2023-12-21 VITALS — SYSTOLIC BLOOD PRESSURE: 108 MMHG | DIASTOLIC BLOOD PRESSURE: 68 MMHG

## 2023-12-21 DIAGNOSIS — M25.511 PAIN IN RIGHT SHOULDER: ICD-10-CM

## 2023-12-21 PROCEDURE — 90686 IIV4 VACC NO PRSV 0.5 ML IM: CPT

## 2023-12-21 PROCEDURE — 99214 OFFICE O/P EST MOD 30 MIN: CPT | Mod: 25

## 2023-12-21 PROCEDURE — G0008: CPT

## 2023-12-21 PROCEDURE — 36415 COLL VENOUS BLD VENIPUNCTURE: CPT

## 2023-12-21 NOTE — PHYSICAL EXAM
[Well Nourished] : well nourished [Well Developed] : well developed [Well-Appearing] : well-appearing [No Lymphadenopathy] : no lymphadenopathy [Supple] : supple [Thyroid Normal, No Nodules] : the thyroid was normal and there were no nodules present [No Respiratory Distress] : no respiratory distress  [No Accessory Muscle Use] : no accessory muscle use [Clear to Auscultation] : lungs were clear to auscultation bilaterally [Normal Rate] : normal rate  [Regular Rhythm] : with a regular rhythm [Normal S1, S2] : normal S1 and S2 [No Murmur] : no murmur heard [No Edema] : there was no peripheral edema [Soft] : abdomen soft [Non Tender] : non-tender [Non-distended] : non-distended [Normal Bowel Sounds] : normal bowel sounds [No Rash] : no rash [Normal Gait] : normal gait [Normal Affect] : the affect was normal [Normal Mood] : the mood was normal [No Joint Swelling] : no joint swelling [Grossly Normal Strength/Tone] : grossly normal strength/tone [de-identified] : friendly [de-identified] : hypopigmented macules on back

## 2023-12-21 NOTE — REVIEW OF SYSTEMS
[Negative] : Heme/Lymph [Recent Change In Weight] : ~T recent weight change [Joint Pain] : joint pain [FreeTextEntry2] : 3 lb gain [FreeTextEntry9] : right shoulder pain

## 2023-12-21 NOTE — PLAN
[FreeTextEntry1] : HLD - check fasting lipids and chemistries. Continue Pravastatin - discussed importance of med compliance.  Borderline DM - check A1c. Limit sweets especially chocolate. Continue diet / exercise. Lose weight. Rx given for massage therapy for right shoulder pain. Follow up with ortho Dr. Santamaria. Flu shot given today.   RTO 6 months fasting.

## 2023-12-21 NOTE — HISTORY OF PRESENT ILLNESS
[FreeTextEntry1] : HLD, borderline DM [de-identified] : Pt comes for follow-up visit and fasting labs.  He reports feeling well. No complaints. He admits to non-compliance with Pravastatin. He stopped taking it a few months ago. He read online that statins can cause memory loss as a long term side effect.  He works out 3x a week at gym with mostly cardio and some weights.  He does eat too many sweets and plans to cut down.  He still has right posterior shoulder pain. He saw Dr. Santamaria in 9/23. He completed course of PT. He still remains in pain.

## 2023-12-22 ENCOUNTER — APPOINTMENT (OUTPATIENT)
Dept: GASTROENTEROLOGY | Facility: CLINIC | Age: 53
End: 2023-12-22

## 2023-12-22 ENCOUNTER — TRANSCRIPTION ENCOUNTER (OUTPATIENT)
Age: 53
End: 2023-12-22

## 2023-12-22 LAB
ALBUMIN SERPL ELPH-MCNC: 4.7 G/DL
ALP BLD-CCNC: 94 U/L
ALT SERPL-CCNC: 45 U/L
ANION GAP SERPL CALC-SCNC: 14 MMOL/L
AST SERPL-CCNC: 26 U/L
BILIRUB SERPL-MCNC: 0.2 MG/DL
BUN SERPL-MCNC: 16 MG/DL
CALCIUM SERPL-MCNC: 9.5 MG/DL
CHLORIDE SERPL-SCNC: 102 MMOL/L
CHOLEST SERPL-MCNC: 243 MG/DL
CO2 SERPL-SCNC: 23 MMOL/L
CREAT SERPL-MCNC: 0.83 MG/DL
EGFR: 103 ML/MIN/1.73M2
ESTIMATED AVERAGE GLUCOSE: 134 MG/DL
GLUCOSE SERPL-MCNC: 97 MG/DL
HBA1C MFR BLD HPLC: 6.3 %
HDLC SERPL-MCNC: 37 MG/DL
LDLC SERPL CALC-MCNC: 140 MG/DL
NONHDLC SERPL-MCNC: 207 MG/DL
POTASSIUM SERPL-SCNC: 4.2 MMOL/L
PROT SERPL-MCNC: 7.8 G/DL
SODIUM SERPL-SCNC: 139 MMOL/L
TRIGL SERPL-MCNC: 364 MG/DL

## 2024-01-25 ENCOUNTER — TRANSCRIPTION ENCOUNTER (OUTPATIENT)
Age: 54
End: 2024-01-25

## 2024-01-29 ENCOUNTER — TRANSCRIPTION ENCOUNTER (OUTPATIENT)
Age: 54
End: 2024-01-29

## 2024-06-20 ENCOUNTER — APPOINTMENT (OUTPATIENT)
Dept: INTERNAL MEDICINE | Facility: CLINIC | Age: 54
End: 2024-06-20
Payer: COMMERCIAL

## 2024-06-20 VITALS
DIASTOLIC BLOOD PRESSURE: 74 MMHG | BODY MASS INDEX: 29.55 KG/M2 | OXYGEN SATURATION: 97 % | WEIGHT: 195 LBS | HEIGHT: 68 IN | SYSTOLIC BLOOD PRESSURE: 112 MMHG | RESPIRATION RATE: 14 BRPM | TEMPERATURE: 98.1 F | HEART RATE: 83 BPM

## 2024-06-20 DIAGNOSIS — R39.11 HESITANCY OF MICTURITION: ICD-10-CM

## 2024-06-20 DIAGNOSIS — R73.03 PREDIABETES.: ICD-10-CM

## 2024-06-20 DIAGNOSIS — E78.00 PURE HYPERCHOLESTEROLEMIA, UNSPECIFIED: ICD-10-CM

## 2024-06-20 PROCEDURE — G2211 COMPLEX E/M VISIT ADD ON: CPT

## 2024-06-20 PROCEDURE — 99214 OFFICE O/P EST MOD 30 MIN: CPT

## 2024-06-20 NOTE — PLAN
[FreeTextEntry1] : HLD - check fasting lipids and chemistries. Continue Pravastatin - lower dose to 20 mg. Buy CoQ10.  Borderline DM - check A1c. Limit sweets. Continue diet / exercise. Lose weight. Urology referral for suspected BPH. Lab slip provided as he is not fasting.  RTO 3 months for physical.

## 2024-06-20 NOTE — PHYSICAL EXAM
[Well Nourished] : well nourished [Well Developed] : well developed [Well-Appearing] : well-appearing [No Lymphadenopathy] : no lymphadenopathy [Supple] : supple [Thyroid Normal, No Nodules] : the thyroid was normal and there were no nodules present [No Respiratory Distress] : no respiratory distress  [No Accessory Muscle Use] : no accessory muscle use [Clear to Auscultation] : lungs were clear to auscultation bilaterally [Normal Rate] : normal rate  [Regular Rhythm] : with a regular rhythm [Normal S1, S2] : normal S1 and S2 [No Murmur] : no murmur heard [No Edema] : there was no peripheral edema [Soft] : abdomen soft [Non Tender] : non-tender [Non-distended] : non-distended [Normal Bowel Sounds] : normal bowel sounds [No Joint Swelling] : no joint swelling [Grossly Normal Strength/Tone] : grossly normal strength/tone [No Rash] : no rash [Normal Gait] : normal gait [Normal Affect] : the affect was normal [Normal Mood] : the mood was normal [de-identified] : friendly [de-identified] : hypopigmented macules on back

## 2024-06-20 NOTE — HISTORY OF PRESENT ILLNESS
[FreeTextEntry1] : HLD, borderline DM [de-identified] : Pt comes for follow-up visit and fasting labs.  He reports feeling well. No complaints. He is compliant with the Pravastatin. He does report generalized myalgias since restarting statin. He has nocturia 1+. He also endorses urinary hesitancy and sensation of not completely emptying bladder.  He is concerned of issues with sexual performance.

## 2024-06-20 NOTE — REVIEW OF SYSTEMS
[Recent Change In Weight] : ~T recent weight change [Joint Pain] : joint pain [Negative] : Heme/Lymph [Impotence] : impotence [Poor Libido] : poor libido [FreeTextEntry2] : 3 lb gain

## 2024-06-23 ENCOUNTER — TRANSCRIPTION ENCOUNTER (OUTPATIENT)
Age: 54
End: 2024-06-23

## 2024-06-23 LAB
ALBUMIN SERPL ELPH-MCNC: 4.5 G/DL
ALP BLD-CCNC: 90 U/L
ALT SERPL-CCNC: 37 U/L
ANION GAP SERPL CALC-SCNC: 17 MMOL/L
AST SERPL-CCNC: 22 U/L
BILIRUB SERPL-MCNC: 0.4 MG/DL
BUN SERPL-MCNC: 19 MG/DL
CALCIUM SERPL-MCNC: 9.5 MG/DL
CHLORIDE SERPL-SCNC: 101 MMOL/L
CHOLEST SERPL-MCNC: 251 MG/DL
CO2 SERPL-SCNC: 22 MMOL/L
CREAT SERPL-MCNC: 1.11 MG/DL
EGFR: 78 ML/MIN/1.73M2
ESTIMATED AVERAGE GLUCOSE: 134 MG/DL
GLUCOSE SERPL-MCNC: 107 MG/DL
HBA1C MFR BLD HPLC: 6.3 %
HDLC SERPL-MCNC: 37 MG/DL
LDLC SERPL CALC-MCNC: 174 MG/DL
NONHDLC SERPL-MCNC: 214 MG/DL
POTASSIUM SERPL-SCNC: 4.7 MMOL/L
PROT SERPL-MCNC: 7.6 G/DL
PSA SERPL-MCNC: 0.94 NG/ML
SODIUM SERPL-SCNC: 140 MMOL/L
TRIGL SERPL-MCNC: 213 MG/DL

## 2024-08-02 ENCOUNTER — APPOINTMENT (OUTPATIENT)
Dept: UROLOGY | Facility: CLINIC | Age: 54
End: 2024-08-02
Payer: COMMERCIAL

## 2024-08-02 VITALS
HEART RATE: 85 BPM | SYSTOLIC BLOOD PRESSURE: 120 MMHG | DIASTOLIC BLOOD PRESSURE: 79 MMHG | OXYGEN SATURATION: 98 % | HEIGHT: 68 IN | WEIGHT: 195 LBS | BODY MASS INDEX: 29.55 KG/M2

## 2024-08-02 DIAGNOSIS — Z12.5 ENCOUNTER FOR SCREENING FOR MALIGNANT NEOPLASM OF PROSTATE: ICD-10-CM

## 2024-08-02 DIAGNOSIS — N13.8 BENIGN PROSTATIC HYPERPLASIA WITH LOWER URINARY TRACT SYMPMS: ICD-10-CM

## 2024-08-02 DIAGNOSIS — R39.9 UNSPECIFIED SYMPTOMS AND SIGNS INVOLVING THE GENITOURINARY SYSTEM: ICD-10-CM

## 2024-08-02 DIAGNOSIS — N52.9 MALE ERECTILE DYSFUNCTION, UNSPECIFIED: ICD-10-CM

## 2024-08-02 DIAGNOSIS — N40.1 BENIGN PROSTATIC HYPERPLASIA WITH LOWER URINARY TRACT SYMPMS: ICD-10-CM

## 2024-08-02 PROCEDURE — 99204 OFFICE O/P NEW MOD 45 MIN: CPT

## 2024-08-02 RX ORDER — TADALAFIL 5 MG/1
5 TABLET ORAL
Qty: 90 | Refills: 1 | Status: ACTIVE | COMMUNITY
Start: 2024-08-02 | End: 1900-01-01

## 2024-08-02 NOTE — END OF VISIT
[FreeTextEntry3] : Prior to appointment and during encounter with patient extensive medical records were reviewed including but not limited to, hospital records, outpatient records, imaging results, and lab data.  During this appointment the patient was examined, diagnoses were discussed and explained in a face-to-face manner.  In addition, extensive time was spent reviewing aforementioned diagnostic studies.  Counseling including imaging results, differential diagnoses, treatment options, risk and benefits, lifestyle changes, current condition, and current medications was performed.  Patient's questions and concerns were addressed.  Patient verbalized understanding of the treatment plan.  Time spent is for reviewing chart, labs and images, counseling and care coordination.  [Time Spent: ___ minutes] : I have spent [unfilled] minutes of time on the encounter.

## 2024-08-02 NOTE — HISTORY OF PRESENT ILLNESS
[FreeTextEntry1] : Wife [employer] employee.   Follow my health: Active user.   54 -year-old male presents for lower urinary tract symptoms.  Has been having bother with urination for last 1 year.  Reports reasonable stream, urinates 5 to 6 x or so during the day and nocturia of 1 x. Sometimes has to urinate within 10 minutes, then has hesitancy, straining and slow stream. Denies dysuria, hematuria, lower abdominal or flank pain, fever, chills or rigors. Rates erections 3-4/5. Able to attain, maintain and penetrate.  Has normal libido.  Complaining of decreased ejaculation.  Past occasional smoker.

## 2024-08-02 NOTE — PHYSICAL EXAM
[Normal Appearance] : normal appearance [General Appearance - In No Acute Distress] : no acute distress [Edema] : no peripheral edema [Normal Station and Gait] : the gait and station were normal for the patient's age [] : no rash [No Focal Deficits] : no focal deficits [Oriented To Time, Place, And Person] : oriented to person, place, and time [Affect] : the affect was normal [Mood] : the mood was normal

## 2024-08-02 NOTE — ASSESSMENT
[FreeTextEntry1] : Reviewed records. Discussed labs.   Prostate cancer screening: Discussed PSA screening and latest recommendations/guidelines: USPTF and AUA. Mentioned as per comprehensive review and meta-analysis digital rectal exam exhibited a notably low diagnostic value for prostate cancer detection. With suggestion that it could be potentially omitted.  Patient agreeable with not doing digital rectal exam.  Lower urinary tract symptoms:  Benign Prostatic Hyperplasia: Erectile dysfunction: Will get Urinalysis and Urine culture.  Will get Total and Free Testosterone. Discussed treatment options. Discussed combined use of Tadalafil 5 mg daily in Benign Prostatic Hyperplasia and Erectile dysfunction. Patient agreeable. Explained the common adverse effects of therapy including, but not limited to headache, flushing, upset stomach, nasal congestion, abnormal vision, back pain, and myalgia. Asked patient to stop taking the medicine if he develops chest pain, visual or auditory disturbance. Explained priapism: if erection does not go down after ejaculation or lasts more than 4 hours to present to emergency room.   Will inform results.  Return to office in 2 months or sooner if any issues: will do uroflow and check post void residual.

## 2024-08-02 NOTE — REVIEW OF SYSTEMS
[see HPI] : see HPI [Negative] : Heme/Lymph [Poor quality erections] : Poor quality erections [Wake up at night to urinate  How many times?  ___] : wakes up to urinate [unfilled] times during the night [Slow urine stream] : slow urine stream [Bladder fullness after urinating] : bladder fullness after urinating

## 2024-08-05 LAB
APPEARANCE: CLEAR
BACTERIA UR CULT: NORMAL
BACTERIA: NEGATIVE /HPF
BILIRUBIN URINE: NEGATIVE
BLOOD URINE: NEGATIVE
CAST: 0 /LPF
COLOR: YELLOW
EPITHELIAL CELLS: 0 /HPF
GLUCOSE QUALITATIVE U: NEGATIVE MG/DL
KETONES URINE: NEGATIVE MG/DL
LEUKOCYTE ESTERASE URINE: NEGATIVE
MICROSCOPIC-UA: NORMAL
NITRITE URINE: NEGATIVE
PH URINE: 5.5
PROTEIN URINE: NEGATIVE MG/DL
RED BLOOD CELLS URINE: 1 /HPF
SPECIFIC GRAVITY URINE: 1.03
UROBILINOGEN URINE: 0.2 MG/DL
WHITE BLOOD CELLS URINE: 0 /HPF

## 2024-08-29 DIAGNOSIS — N52.9 MALE ERECTILE DYSFUNCTION, UNSPECIFIED: ICD-10-CM

## 2024-09-23 ENCOUNTER — NON-APPOINTMENT (OUTPATIENT)
Age: 54
End: 2024-09-23

## 2024-09-23 ENCOUNTER — APPOINTMENT (OUTPATIENT)
Dept: INTERNAL MEDICINE | Facility: CLINIC | Age: 54
End: 2024-09-23
Payer: COMMERCIAL

## 2024-09-23 VITALS
SYSTOLIC BLOOD PRESSURE: 126 MMHG | HEIGHT: 68 IN | WEIGHT: 195 LBS | BODY MASS INDEX: 29.55 KG/M2 | OXYGEN SATURATION: 99 % | RESPIRATION RATE: 14 BRPM | DIASTOLIC BLOOD PRESSURE: 80 MMHG | HEART RATE: 64 BPM

## 2024-09-23 DIAGNOSIS — Z00.00 ENCOUNTER FOR GENERAL ADULT MEDICAL EXAMINATION W/OUT ABNORMAL FINDINGS: ICD-10-CM

## 2024-09-23 DIAGNOSIS — E78.00 PURE HYPERCHOLESTEROLEMIA, UNSPECIFIED: ICD-10-CM

## 2024-09-23 DIAGNOSIS — E66.3 OVERWEIGHT: ICD-10-CM

## 2024-09-23 DIAGNOSIS — R79.89 OTHER SPECIFIED ABNORMAL FINDINGS OF BLOOD CHEMISTRY: ICD-10-CM

## 2024-09-23 DIAGNOSIS — N13.8 BENIGN PROSTATIC HYPERPLASIA WITH LOWER URINARY TRACT SYMPMS: ICD-10-CM

## 2024-09-23 DIAGNOSIS — R73.03 PREDIABETES.: ICD-10-CM

## 2024-09-23 DIAGNOSIS — N40.1 BENIGN PROSTATIC HYPERPLASIA WITH LOWER URINARY TRACT SYMPMS: ICD-10-CM

## 2024-09-23 DIAGNOSIS — E55.9 VITAMIN D DEFICIENCY, UNSPECIFIED: ICD-10-CM

## 2024-09-23 PROCEDURE — 93000 ELECTROCARDIOGRAM COMPLETE: CPT

## 2024-09-23 PROCEDURE — 36415 COLL VENOUS BLD VENIPUNCTURE: CPT

## 2024-09-23 PROCEDURE — 99396 PREV VISIT EST AGE 40-64: CPT

## 2024-09-23 NOTE — PLAN
[FreeTextEntry1] : Check routine fasting labs. Discussed diet, exercise, and weight loss efforts.  He will get Flu shot this Fall.  Advised again to see GI for screening colonoscopy. Recommendation provided in [place]. Prostate cancer screening with PSA.  HLD - check fasting lipid panel / chemistries, continue Pravastatin 20 mg qd and CoQ10. Borderline DM - check A1c. Watch carb intake. BPH / ED - improved on Tadalafil. AM testosterone level repeated today. Followed with urology Dr. Pascual. Check vitamin D level, not on supplement.  RTO 6 months fasting.

## 2024-09-23 NOTE — HISTORY OF PRESENT ILLNESS
[FreeTextEntry1] : physical [de-identified] : Patient comes for an annual exam.  He reports feeling well. Compliant with medications. He is taking Pravastatin daily, dose lowered to 20 mg in 6/24. He started taking CoQ10, and the myalgias have resolved. He saw urologist Dr. Pascual last month, diagnosed with BPH - started on Tadalafil with good results. Also found to have low T, had repeat test done this morning.

## 2024-09-23 NOTE — REVIEW OF SYSTEMS
[Joint Pain] : joint pain [FreeTextEntry2] : 1 lb loss  [FreeTextEntry9] : right shoulder pain x few months [Recent Change In Weight] : ~T no recent weight change [Negative] : Musculoskeletal

## 2024-09-23 NOTE — PHYSICAL EXAM
[Well Nourished] : well nourished [Well Developed] : well developed [Well-Appearing] : well-appearing [Normal Sclera/Conjunctiva] : normal sclera/conjunctiva [PERRL] : pupils equal round and reactive to light [EOMI] : extraocular movements intact [Normal Outer Ear/Nose] : the outer ears and nose were normal in appearance [Normal Oropharynx] : the oropharynx was normal [No JVD] : no jugular venous distention [No Lymphadenopathy] : no lymphadenopathy [Supple] : supple [Thyroid Normal, No Nodules] : the thyroid was normal and there were no nodules present [No Respiratory Distress] : no respiratory distress  [No Accessory Muscle Use] : no accessory muscle use [Clear to Auscultation] : lungs were clear to auscultation bilaterally [Normal Rate] : normal rate  [Regular Rhythm] : with a regular rhythm [Normal S1, S2] : normal S1 and S2 [No Murmur] : no murmur heard [No Carotid Bruits] : no carotid bruits [Pedal Pulses Present] : the pedal pulses are present [No Edema] : there was no peripheral edema [Soft] : abdomen soft [Non Tender] : non-tender [Non-distended] : non-distended [No Masses] : no abdominal mass palpated [No HSM] : no HSM [Normal Bowel Sounds] : normal bowel sounds [No CVA Tenderness] : no CVA  tenderness [No Spinal Tenderness] : no spinal tenderness [No Joint Swelling] : no joint swelling [Grossly Normal Strength/Tone] : grossly normal strength/tone [No Rash] : no rash [Coordination Grossly Intact] : coordination grossly intact [No Focal Deficits] : no focal deficits [Normal Gait] : normal gait [Normal Affect] : the affect was normal [Normal Mood] : the mood was normal [Normal Insight/Judgement] : insight and judgment were intact [Normal TMs] : both tympanic membranes were normal [de-identified] : friendly [FreeTextEntry1] : defer to GI  [de-identified] : hypopigmented macules on back

## 2024-09-23 NOTE — HISTORY OF PRESENT ILLNESS
[FreeTextEntry1] : physical [de-identified] : Patient comes for an annual exam.  He reports feeling well. Compliant with medications. He is taking Pravastatin daily, dose lowered to 20 mg in 6/24. He started taking CoQ10, and the myalgias have resolved. He saw urologist Dr. Pascual last month, diagnosed with BPH - started on Tadalafil with good results. Also found to have low T, had repeat test done this morning.

## 2024-09-23 NOTE — PHYSICAL EXAM
[Well Nourished] : well nourished [Well Developed] : well developed [Well-Appearing] : well-appearing [Normal Sclera/Conjunctiva] : normal sclera/conjunctiva [PERRL] : pupils equal round and reactive to light [EOMI] : extraocular movements intact [Normal Outer Ear/Nose] : the outer ears and nose were normal in appearance [Normal Oropharynx] : the oropharynx was normal [No JVD] : no jugular venous distention [No Lymphadenopathy] : no lymphadenopathy [Supple] : supple [Thyroid Normal, No Nodules] : the thyroid was normal and there were no nodules present [No Respiratory Distress] : no respiratory distress  [No Accessory Muscle Use] : no accessory muscle use [Clear to Auscultation] : lungs were clear to auscultation bilaterally [Normal Rate] : normal rate  [Regular Rhythm] : with a regular rhythm [Normal S1, S2] : normal S1 and S2 [No Murmur] : no murmur heard [No Carotid Bruits] : no carotid bruits [Pedal Pulses Present] : the pedal pulses are present [No Edema] : there was no peripheral edema [Soft] : abdomen soft [Non Tender] : non-tender [Non-distended] : non-distended [No Masses] : no abdominal mass palpated [No HSM] : no HSM [Normal Bowel Sounds] : normal bowel sounds [No CVA Tenderness] : no CVA  tenderness [No Spinal Tenderness] : no spinal tenderness [No Joint Swelling] : no joint swelling [Grossly Normal Strength/Tone] : grossly normal strength/tone [No Rash] : no rash [Coordination Grossly Intact] : coordination grossly intact [No Focal Deficits] : no focal deficits [Normal Gait] : normal gait [Normal Affect] : the affect was normal [Normal Mood] : the mood was normal [Normal Insight/Judgement] : insight and judgment were intact [Normal TMs] : both tympanic membranes were normal [de-identified] : friendly [FreeTextEntry1] : defer to GI  [de-identified] : hypopigmented macules on back

## 2024-09-23 NOTE — HEALTH RISK ASSESSMENT
[Excellent] : ~his/her~  mood as  excellent [No] : In the past 12 months have you used drugs other than those required for medical reasons? No [No falls in past year] : Patient reported no falls in the past year [0] : 2) Feeling down, depressed, or hopeless: Not at all (0) [Former] : Former [10-14] : 10-14 [< 15 Years] : < 15 Years [Patient declined colonoscopy] : Patient declined colonoscopy [None] : None [With Family] : lives with family [Employed] : employed [] :  [# Of Children ___] : has [unfilled] children [Sexually Active] : sexually active [Reports changes in vision] : Reports changes in vision [Smoke Detector] : smoke detector [Carbon Monoxide Detector] : carbon monoxide detector [Seat Belt] :  uses seat belt [Sunscreen] : uses sunscreen [Yes] : Yes [Monthly or less (1 pt)] : Monthly or less (1 point) [1 or 2 (0 pts)] : 1 or 2 (0 points) [Never (0 pts)] : Never (0 points) [PHQ-2 Negative - No further assessment needed] : PHQ-2 Negative - No further assessment needed [Audit-CScore] : 1 [de-identified] : gym - weights / cardio 3x a week  [[id number]] : 0 [Reports changes in hearing] : Reports no changes in hearing [Reports changes in dental health] : Reports no changes in dental health [ColonoscopyComments] : never  [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

## 2024-09-23 NOTE — HEALTH RISK ASSESSMENT
[Excellent] : ~his/her~  mood as  excellent [No] : In the past 12 months have you used drugs other than those required for medical reasons? No [No falls in past year] : Patient reported no falls in the past year [0] : 2) Feeling down, depressed, or hopeless: Not at all (0) [Former] : Former [10-14] : 10-14 [< 15 Years] : < 15 Years [Patient declined colonoscopy] : Patient declined colonoscopy [None] : None [With Family] : lives with family [Employed] : employed [] :  [# Of Children ___] : has [unfilled] children [Sexually Active] : sexually active [Reports changes in vision] : Reports changes in vision [Smoke Detector] : smoke detector [Carbon Monoxide Detector] : carbon monoxide detector [Seat Belt] :  uses seat belt [Sunscreen] : uses sunscreen [Yes] : Yes [Monthly or less (1 pt)] : Monthly or less (1 point) [1 or 2 (0 pts)] : 1 or 2 (0 points) [Never (0 pts)] : Never (0 points) [PHQ-2 Negative - No further assessment needed] : PHQ-2 Negative - No further assessment needed [Audit-CScore] : 1 [de-identified] : gym - weights / cardio 3x a week  [[id number]] : 0 [Reports changes in hearing] : Reports no changes in hearing [Reports changes in dental health] : Reports no changes in dental health [ColonoscopyComments] : never  [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

## 2024-09-23 NOTE — PLAN
[FreeTextEntry1] : Check routine fasting labs. Discussed diet, exercise, and weight loss efforts.  He will get Flu shot this Fall.  Advised again to see GI for screening colonoscopy. Recommendation provided in [place]. Prostate cancer screening with PSA.  HLD - check fasting lipid panel / chemistries, continue Pravastatin 20 mg qd and CoQ10. Borderline DM - check A1c. Watch carb intake. BPH / ED - improved on Tadalafil. AM testosterone level repeated today. Followed with urology Dr. Pascual. Check vitamin D level, not on supplement.  RTO 6 months fasting.

## 2024-09-24 ENCOUNTER — TRANSCRIPTION ENCOUNTER (OUTPATIENT)
Age: 54
End: 2024-09-24

## 2024-09-24 LAB
25(OH)D3 SERPL-MCNC: 21.7 NG/ML
ALBUMIN SERPL ELPH-MCNC: 4.5 G/DL
ALP BLD-CCNC: 84 U/L
ALT SERPL-CCNC: 30 U/L
ANION GAP SERPL CALC-SCNC: 15 MMOL/L
APPEARANCE: CLEAR
AST SERPL-CCNC: 22 U/L
BACTERIA: NEGATIVE /HPF
BASOPHILS # BLD AUTO: 0.05 K/UL
BASOPHILS NFR BLD AUTO: 0.6 %
BILIRUB SERPL-MCNC: 0.5 MG/DL
BILIRUBIN URINE: NEGATIVE
BLOOD URINE: NEGATIVE
BUN SERPL-MCNC: 14 MG/DL
CALCIUM SERPL-MCNC: 9.9 MG/DL
CAST: 0 /LPF
CHLORIDE SERPL-SCNC: 101 MMOL/L
CHOLEST SERPL-MCNC: 187 MG/DL
CO2 SERPL-SCNC: 24 MMOL/L
COLOR: YELLOW
CREAT SERPL-MCNC: 0.92 MG/DL
EGFR: 98 ML/MIN/1.73M2
EOSINOPHIL # BLD AUTO: 0.24 K/UL
EOSINOPHIL NFR BLD AUTO: 2.8 %
EPITHELIAL CELLS: 0 /HPF
ESTIMATED AVERAGE GLUCOSE: 134 MG/DL
GLUCOSE QUALITATIVE U: NEGATIVE MG/DL
GLUCOSE SERPL-MCNC: 95 MG/DL
HBA1C MFR BLD HPLC: 6.3 %
HCT VFR BLD CALC: 43 %
HDLC SERPL-MCNC: 33 MG/DL
HGB BLD-MCNC: 13.9 G/DL
IMM GRANULOCYTES NFR BLD AUTO: 0.3 %
KETONES URINE: NEGATIVE MG/DL
LDLC SERPL CALC-MCNC: 116 MG/DL
LEUKOCYTE ESTERASE URINE: ABNORMAL
LYMPHOCYTES # BLD AUTO: 2.4 K/UL
LYMPHOCYTES NFR BLD AUTO: 27.9 %
MAN DIFF?: NORMAL
MCHC RBC-ENTMCNC: 26.1 PG
MCHC RBC-ENTMCNC: 32.3 GM/DL
MCV RBC AUTO: 80.7 FL
MICROSCOPIC-UA: NORMAL
MONOCYTES # BLD AUTO: 0.45 K/UL
MONOCYTES NFR BLD AUTO: 5.2 %
NEUTROPHILS # BLD AUTO: 5.44 K/UL
NEUTROPHILS NFR BLD AUTO: 63.2 %
NITRITE URINE: NEGATIVE
NONHDLC SERPL-MCNC: 154 MG/DL
PH URINE: 7
PLATELET # BLD AUTO: 222 K/UL
POTASSIUM SERPL-SCNC: 4.6 MMOL/L
PROT SERPL-MCNC: 7.4 G/DL
PROTEIN URINE: NEGATIVE MG/DL
PSA SERPL-MCNC: 0.77 NG/ML
RBC # BLD: 5.33 M/UL
RBC # FLD: 14.2 %
RED BLOOD CELLS URINE: 0 /HPF
SODIUM SERPL-SCNC: 140 MMOL/L
SPECIFIC GRAVITY URINE: 1.02
TESTOST FREE SERPL-MCNC: 5.1 PG/ML
TESTOST SERPL-MCNC: 289 NG/DL
TRIGL SERPL-MCNC: 217 MG/DL
TSH SERPL-ACNC: 3.69 UIU/ML
UROBILINOGEN URINE: 0.2 MG/DL
VIT B12 SERPL-MCNC: 821 PG/ML
WBC # FLD AUTO: 8.61 K/UL
WHITE BLOOD CELLS URINE: 2 /HPF

## 2024-09-25 ENCOUNTER — TRANSCRIPTION ENCOUNTER (OUTPATIENT)
Age: 54
End: 2024-09-25

## 2024-09-30 ENCOUNTER — TRANSCRIPTION ENCOUNTER (OUTPATIENT)
Age: 54
End: 2024-09-30

## 2024-10-03 ENCOUNTER — TRANSCRIPTION ENCOUNTER (OUTPATIENT)
Age: 54
End: 2024-10-03

## 2024-11-01 ENCOUNTER — APPOINTMENT (OUTPATIENT)
Dept: UROLOGY | Facility: CLINIC | Age: 54
End: 2024-11-01

## 2024-11-08 ENCOUNTER — APPOINTMENT (OUTPATIENT)
Dept: UROLOGY | Facility: CLINIC | Age: 54
End: 2024-11-08

## 2024-11-08 DIAGNOSIS — R79.89 OTHER SPECIFIED ABNORMAL FINDINGS OF BLOOD CHEMISTRY: ICD-10-CM

## 2024-11-08 PROCEDURE — 99214 OFFICE O/P EST MOD 30 MIN: CPT

## 2025-03-27 ENCOUNTER — APPOINTMENT (OUTPATIENT)
Dept: INTERNAL MEDICINE | Facility: CLINIC | Age: 55
End: 2025-03-27
Payer: COMMERCIAL

## 2025-03-27 VITALS
HEIGHT: 68 IN | OXYGEN SATURATION: 98 % | TEMPERATURE: 97.9 F | HEART RATE: 81 BPM | BODY MASS INDEX: 29.7 KG/M2 | SYSTOLIC BLOOD PRESSURE: 126 MMHG | DIASTOLIC BLOOD PRESSURE: 84 MMHG | RESPIRATION RATE: 16 BRPM | WEIGHT: 196 LBS

## 2025-03-27 DIAGNOSIS — E66.3 OVERWEIGHT: ICD-10-CM

## 2025-03-27 DIAGNOSIS — R73.03 PREDIABETES.: ICD-10-CM

## 2025-03-27 DIAGNOSIS — E78.00 PURE HYPERCHOLESTEROLEMIA, UNSPECIFIED: ICD-10-CM

## 2025-03-27 PROCEDURE — G2211 COMPLEX E/M VISIT ADD ON: CPT

## 2025-03-27 PROCEDURE — 99214 OFFICE O/P EST MOD 30 MIN: CPT

## 2025-03-27 PROCEDURE — 36415 COLL VENOUS BLD VENIPUNCTURE: CPT

## 2025-03-28 ENCOUNTER — NON-APPOINTMENT (OUTPATIENT)
Age: 55
End: 2025-03-28

## 2025-03-31 ENCOUNTER — TRANSCRIPTION ENCOUNTER (OUTPATIENT)
Age: 55
End: 2025-03-31

## 2025-03-31 LAB
ALBUMIN SERPL ELPH-MCNC: 4.8 G/DL
ALP BLD-CCNC: 90 U/L
ALT SERPL-CCNC: 32 U/L
ANION GAP SERPL CALC-SCNC: 11 MMOL/L
AST SERPL-CCNC: 22 U/L
BILIRUB SERPL-MCNC: 0.6 MG/DL
BUN SERPL-MCNC: 13 MG/DL
CALCIUM SERPL-MCNC: 9.8 MG/DL
CHLORIDE SERPL-SCNC: 102 MMOL/L
CHOLEST SERPL-MCNC: 199 MG/DL
CO2 SERPL-SCNC: 28 MMOL/L
CREAT SERPL-MCNC: 0.79 MG/DL
EGFRCR SERPLBLD CKD-EPI 2021: 104 ML/MIN/1.73M2
ESTIMATED AVERAGE GLUCOSE: 137 MG/DL
GLUCOSE SERPL-MCNC: 100 MG/DL
HBA1C MFR BLD HPLC: 6.4 %
HDLC SERPL-MCNC: 31 MG/DL
LDLC SERPL-MCNC: 125 MG/DL
NONHDLC SERPL-MCNC: 168 MG/DL
POTASSIUM SERPL-SCNC: 4.5 MMOL/L
PROT SERPL-MCNC: 8 G/DL
SODIUM SERPL-SCNC: 141 MMOL/L
TRIGL SERPL-MCNC: 242 MG/DL

## 2025-04-03 RX ORDER — SODIUM SULFATE, POTASSIUM SULFATE AND MAGNESIUM SULFATE 1.6; 3.13; 17.5 G/177ML; G/177ML; G/177ML
17.5-3.13-1.6 SOLUTION ORAL
Qty: 2 | Refills: 0 | Status: ACTIVE | COMMUNITY
Start: 2025-04-03 | End: 1900-01-01

## 2025-05-09 ENCOUNTER — APPOINTMENT (OUTPATIENT)
Dept: UROLOGY | Facility: CLINIC | Age: 55
End: 2025-05-09

## 2025-05-09 VITALS
RESPIRATION RATE: 16 BRPM | WEIGHT: 192 LBS | DIASTOLIC BLOOD PRESSURE: 78 MMHG | HEIGHT: 68 IN | OXYGEN SATURATION: 98 % | HEART RATE: 91 BPM | SYSTOLIC BLOOD PRESSURE: 130 MMHG | BODY MASS INDEX: 29.1 KG/M2

## 2025-05-09 DIAGNOSIS — Z12.5 ENCOUNTER FOR SCREENING FOR MALIGNANT NEOPLASM OF PROSTATE: ICD-10-CM

## 2025-05-09 DIAGNOSIS — R39.9 UNSPECIFIED SYMPTOMS AND SIGNS INVOLVING THE GENITOURINARY SYSTEM: ICD-10-CM

## 2025-05-09 DIAGNOSIS — N40.1 BENIGN PROSTATIC HYPERPLASIA WITH LOWER URINARY TRACT SYMPMS: ICD-10-CM

## 2025-05-09 DIAGNOSIS — N52.9 MALE ERECTILE DYSFUNCTION, UNSPECIFIED: ICD-10-CM

## 2025-05-09 DIAGNOSIS — E29.1 TESTICULAR HYPOFUNCTION: ICD-10-CM

## 2025-05-09 DIAGNOSIS — N13.8 BENIGN PROSTATIC HYPERPLASIA WITH LOWER URINARY TRACT SYMPMS: ICD-10-CM

## 2025-05-09 PROCEDURE — 51741 ELECTRO-UROFLOWMETRY FIRST: CPT

## 2025-05-09 PROCEDURE — 99214 OFFICE O/P EST MOD 30 MIN: CPT | Mod: 25

## 2025-05-09 RX ORDER — TAMSULOSIN HYDROCHLORIDE 0.4 MG/1
0.4 CAPSULE ORAL
Qty: 90 | Refills: 3 | Status: ACTIVE | COMMUNITY
Start: 2025-05-09 | End: 1900-01-01

## 2025-05-22 ENCOUNTER — TRANSCRIPTION ENCOUNTER (OUTPATIENT)
Age: 55
End: 2025-05-22

## 2025-05-23 ENCOUNTER — TRANSCRIPTION ENCOUNTER (OUTPATIENT)
Age: 55
End: 2025-05-23

## 2025-06-03 ENCOUNTER — TRANSCRIPTION ENCOUNTER (OUTPATIENT)
Age: 55
End: 2025-06-03

## 2025-06-04 ENCOUNTER — APPOINTMENT (OUTPATIENT)
Dept: GASTROENTEROLOGY | Facility: AMBULATORY MEDICAL SERVICES | Age: 55
End: 2025-06-04

## 2025-08-08 ENCOUNTER — APPOINTMENT (OUTPATIENT)
Dept: UROLOGY | Facility: CLINIC | Age: 55
End: 2025-08-08

## 2025-08-08 VITALS
WEIGHT: 194 LBS | HEIGHT: 68 IN | DIASTOLIC BLOOD PRESSURE: 81 MMHG | SYSTOLIC BLOOD PRESSURE: 123 MMHG | OXYGEN SATURATION: 98 % | RESPIRATION RATE: 18 BRPM | BODY MASS INDEX: 29.4 KG/M2 | HEART RATE: 82 BPM

## 2025-08-08 PROCEDURE — 51798 US URINE CAPACITY MEASURE: CPT

## 2025-08-08 PROCEDURE — 99214 OFFICE O/P EST MOD 30 MIN: CPT

## 2025-08-08 PROCEDURE — 51741 ELECTRO-UROFLOWMETRY FIRST: CPT
